# Patient Record
Sex: MALE | Race: OTHER | NOT HISPANIC OR LATINO | URBAN - METROPOLITAN AREA
[De-identification: names, ages, dates, MRNs, and addresses within clinical notes are randomized per-mention and may not be internally consistent; named-entity substitution may affect disease eponyms.]

---

## 2021-02-22 ENCOUNTER — EMERGENCY (EMERGENCY)
Facility: HOSPITAL | Age: 26
LOS: 1 days | Discharge: HOME | End: 2021-02-22
Admitting: EMERGENCY MEDICINE
Payer: COMMERCIAL

## 2021-02-22 VITALS
OXYGEN SATURATION: 97 % | RESPIRATION RATE: 18 BRPM | SYSTOLIC BLOOD PRESSURE: 132 MMHG | TEMPERATURE: 98 F | HEART RATE: 88 BPM | DIASTOLIC BLOOD PRESSURE: 78 MMHG

## 2021-02-22 VITALS
TEMPERATURE: 98 F | SYSTOLIC BLOOD PRESSURE: 117 MMHG | DIASTOLIC BLOOD PRESSURE: 67 MMHG | HEART RATE: 90 BPM | WEIGHT: 166.89 LBS | HEIGHT: 65 IN | RESPIRATION RATE: 18 BRPM | OXYGEN SATURATION: 98 %

## 2021-02-22 DIAGNOSIS — R53.81 OTHER MALAISE: ICD-10-CM

## 2021-02-22 DIAGNOSIS — R19.7 DIARRHEA, UNSPECIFIED: ICD-10-CM

## 2021-02-22 DIAGNOSIS — M79.10 MYALGIA, UNSPECIFIED SITE: ICD-10-CM

## 2021-02-22 LAB
ALBUMIN SERPL ELPH-MCNC: 4.8 G/DL — SIGNIFICANT CHANGE UP (ref 3.4–5)
ALP SERPL-CCNC: 78 U/L — SIGNIFICANT CHANGE UP (ref 40–120)
ALT FLD-CCNC: 77 U/L — HIGH (ref 12–42)
ANION GAP SERPL CALC-SCNC: 14 MMOL/L — SIGNIFICANT CHANGE UP (ref 9–16)
AST SERPL-CCNC: 40 U/L — HIGH (ref 15–37)
BASOPHILS # BLD AUTO: 0.03 K/UL — SIGNIFICANT CHANGE UP (ref 0–0.2)
BASOPHILS NFR BLD AUTO: 0.5 % — SIGNIFICANT CHANGE UP (ref 0–2)
BILIRUB SERPL-MCNC: 1.7 MG/DL — HIGH (ref 0.2–1.2)
BUN SERPL-MCNC: 11 MG/DL — SIGNIFICANT CHANGE UP (ref 7–23)
CALCIUM SERPL-MCNC: 10.1 MG/DL — SIGNIFICANT CHANGE UP (ref 8.5–10.5)
CHLORIDE SERPL-SCNC: 102 MMOL/L — SIGNIFICANT CHANGE UP (ref 96–108)
CO2 SERPL-SCNC: 22 MMOL/L — SIGNIFICANT CHANGE UP (ref 22–31)
CREAT SERPL-MCNC: 1.23 MG/DL — SIGNIFICANT CHANGE UP (ref 0.5–1.3)
EOSINOPHIL # BLD AUTO: 0.14 K/UL — SIGNIFICANT CHANGE UP (ref 0–0.5)
EOSINOPHIL NFR BLD AUTO: 2.4 % — SIGNIFICANT CHANGE UP (ref 0–6)
GLUCOSE SERPL-MCNC: 99 MG/DL — SIGNIFICANT CHANGE UP (ref 70–99)
HCT VFR BLD CALC: 45 % — SIGNIFICANT CHANGE UP (ref 39–50)
HGB BLD-MCNC: 15.4 G/DL — SIGNIFICANT CHANGE UP (ref 13–17)
IMM GRANULOCYTES NFR BLD AUTO: 0.3 % — SIGNIFICANT CHANGE UP (ref 0–1.5)
LYMPHOCYTES # BLD AUTO: 1 K/UL — SIGNIFICANT CHANGE UP (ref 1–3.3)
LYMPHOCYTES # BLD AUTO: 17.2 % — SIGNIFICANT CHANGE UP (ref 13–44)
MCHC RBC-ENTMCNC: 32.4 PG — SIGNIFICANT CHANGE UP (ref 27–34)
MCHC RBC-ENTMCNC: 34.2 GM/DL — SIGNIFICANT CHANGE UP (ref 32–36)
MCV RBC AUTO: 94.5 FL — SIGNIFICANT CHANGE UP (ref 80–100)
MONOCYTES # BLD AUTO: 0.53 K/UL — SIGNIFICANT CHANGE UP (ref 0–0.9)
MONOCYTES NFR BLD AUTO: 9.1 % — SIGNIFICANT CHANGE UP (ref 2–14)
NEUTROPHILS # BLD AUTO: 4.08 K/UL — SIGNIFICANT CHANGE UP (ref 1.8–7.4)
NEUTROPHILS NFR BLD AUTO: 70.5 % — SIGNIFICANT CHANGE UP (ref 43–77)
NRBC # BLD: 0 /100 WBCS — SIGNIFICANT CHANGE UP (ref 0–0)
PLATELET # BLD AUTO: 327 K/UL — SIGNIFICANT CHANGE UP (ref 150–400)
POTASSIUM SERPL-MCNC: 4 MMOL/L — SIGNIFICANT CHANGE UP (ref 3.5–5.3)
POTASSIUM SERPL-SCNC: 4 MMOL/L — SIGNIFICANT CHANGE UP (ref 3.5–5.3)
PROT SERPL-MCNC: 8.3 G/DL — HIGH (ref 6.4–8.2)
RBC # BLD: 4.76 M/UL — SIGNIFICANT CHANGE UP (ref 4.2–5.8)
RBC # FLD: 12.3 % — SIGNIFICANT CHANGE UP (ref 10.3–14.5)
SODIUM SERPL-SCNC: 138 MMOL/L — SIGNIFICANT CHANGE UP (ref 132–145)
WBC # BLD: 5.8 K/UL — SIGNIFICANT CHANGE UP (ref 3.8–10.5)
WBC # FLD AUTO: 5.8 K/UL — SIGNIFICANT CHANGE UP (ref 3.8–10.5)

## 2021-02-22 PROCEDURE — 99284 EMERGENCY DEPT VISIT MOD MDM: CPT

## 2021-02-22 NOTE — SBIRT NOTE ADULT - NSSBIRTDRGBRIEFINTDET_GEN_A_CORE
Provided SBIRT services:  Full Screen Positive. Brief Intervention Performed.    Screening results were reviewed with the patient and patient was provided information about healthy guidelines and potential negative consequences associated with level of risk. Motivation and readiness to reduce or stop use was discussed and goals and activities to make changes were suggested/offered.      AUDIT Score: 6  DAST-10 Score: 2  Duration = 15 Minutes

## 2021-02-22 NOTE — ED ADULT NURSE NOTE - OBJECTIVE STATEMENT
Pt came in c/o of diarrhea since last wednesday. Pt states "I was in the hospital two weeks ago for an UTI. They prescribed me with Cipro. I finished that. I still wasn't feeling better so I had STI medications and was prescribed doxycycline to take at home last wednesday. That day I also drank and took some mushrooms. Ever since then I had diarrhea. Im not sure if the diarrhea us coming from the medication or the mushrooms." Pt reports chills. Denies fever, sob, n/v, headache/dizziness, numbness/tingling. A&Ox3 speaking in full sentences at this time.

## 2021-02-22 NOTE — ED PROVIDER NOTE - OBJECTIVE STATEMENT
26 y/o male presents to the ED with complaints of diarrhea x 3 days. Patient recently had a UTI with outpatient abx treatment. Patient completed outpatient abx, complicated by concern for STI treated w/ IM ceftriaxone.  Pt is on day 3 of PO doxicycline and presents w/ diarrhea, fatigue and chills.  Pt denies dysuria, discharge or vomiting. 24 y/o male presents to the ED with complaints of diarrhea x 3 days. Patient recently had a UTI with outpatient abx treatment. Patient completed outpatient abx, but also notes he was recently treated with IM ceftriaxone for an STI. Patient is currently on day 3 of PO Doxycycline and presents with 1-2 episodes of brown/watery diarrhea, generalized body aches, and malaise. Patient has not had diarrhea since this morning. Denies fever, chills, chest pain, SOB, vomiting, dysuria.

## 2021-02-22 NOTE — ED PROVIDER NOTE - CLINICAL SUMMARY MEDICAL DECISION MAKING FREE TEXT BOX
Patient presenting with diarrhea, generalized body aches, and malaise s/p antibiotic use. Will obtain labs and reassess. Patient presenting with diarrhea, generalized body aches, and malaise s/p antibiotic use. Patient is very well-appearing and in NAD. Very low suspicious for any type of C. diff given recent abx use. Will obtain labs and reassess.

## 2021-02-22 NOTE — ED ADULT NURSE NOTE - CHIEF COMPLAINT QUOTE
Pt presents c/o diarrhea since Friday of this week.  Pt recently had a UTI w/ outpatient abx treatment.  Pt completed ourpatient abx, complicated by concern for STI treated w/ IM ceftriaxone.  Pt is on day 3 of PO doxicycline and presents w/ diarrhea, fatigue and chills.  Pt denies dysuria, discharge or vomiting.

## 2021-02-22 NOTE — ED PROVIDER NOTE - PATIENT PORTAL LINK FT
You can access the FollowMyHealth Patient Portal offered by [facility] by registering at the following website: [email or web address]. By joining [employer]s FollowMyHealth portal, you will also be able to view your health information using other applications (apps) compatible with our system.

## 2021-02-22 NOTE — SBIRT NOTE ADULT - NSSBIRTFEELGUILT_GEN_A_CORE
Father  Still living? Yes, Estimated age: 51-60  Family history of myocardial infarction, Age at diagnosis: Age Unknown  Family history of cerebrovascular accident (CVA), Age at diagnosis: Age Unknown     Mother  Still living? Yes, Estimated age: 51-60  Family history of myocardial infarction, Age at diagnosis: Age Unknown
Never

## 2022-07-15 ENCOUNTER — EMERGENCY (EMERGENCY)
Facility: HOSPITAL | Age: 27
LOS: 1 days | Discharge: HOME | End: 2022-07-15
Admitting: EMERGENCY MEDICINE

## 2022-07-15 VITALS
OXYGEN SATURATION: 100 % | RESPIRATION RATE: 16 BRPM | SYSTOLIC BLOOD PRESSURE: 123 MMHG | HEIGHT: 65 IN | WEIGHT: 166.89 LBS | HEART RATE: 76 BPM | TEMPERATURE: 99 F | DIASTOLIC BLOOD PRESSURE: 91 MMHG

## 2022-07-15 PROCEDURE — 99283 EMERGENCY DEPT VISIT LOW MDM: CPT

## 2022-07-15 NOTE — ED PROVIDER NOTE - CLINICAL SUMMARY MEDICAL DECISION MAKING FREE TEXT BOX
pt presents with c/o rash to buttocks. no scrotal involvement. improved with antifungal. does not appear fungal at this time but advised continued use of spray for symptomatic relief. also recommended regular washing, using baby powder to reduce moisture and friction for possible heat rash.

## 2022-07-15 NOTE — ED ADULT TRIAGE NOTE - CHIEF COMPLAINT QUOTE
Pt states groin rash since last wednesday, Pt states spreading from buttock area to testicles. Pt states it was itchy but now feels like a "sunburn". Denies fevers or drainage. Some relief with antifungal cream

## 2022-07-15 NOTE — ED ADULT NURSE NOTE - OBJECTIVE STATEMENT
Pt came in c/o of rash to b/l buttocks. Pt reports using antifungal with relief. Denies fever, chills, sob, cp, n/v/d. A&Ox3 speaking in full sentences

## 2022-07-15 NOTE — ED PROVIDER NOTE - PATIENT PORTAL LINK FT
You can access the FollowMyHealth Patient Portal offered by [facility] by registering at the following website: [email or web address]. By joining [employer]’s FollowMyHealth portal, you will also be able to view your health information using other applications (apps) compatible with our system.

## 2022-07-15 NOTE — ED PROVIDER NOTE - NSFOLLOWUPINSTRUCTIONS_ED_ALL_ED_FT
CONTINUE SPRAY AS INSTRUCTED ON BOTTLE IF IT'S HELPING.     USE BABY POWDER TO REDUCE FRICTION AND MOISTURE WHICH CAN CAUSE SYMPTOMS.     Acute Rash    WHAT YOU NEED TO KNOW:    A rash is irritation, redness, or itchiness in the skin or mucus membranes. Mucus membranes are areas such as the lining of your nose or throat. Acute means the rash starts suddenly, worsens quickly, and lasts a short time. An acute rash may be caused by a disease, such as hepatitis or vasculitis. The rash may be a reaction to something you are allergic to, such as certain foods, or latex. Certain medicines, including antibiotics, NSAIDs, prescription pain medicine, and aspirin can also cause a rash.     DISCHARGE INSTRUCTIONS:    Return to the emergency department if:     You have sudden trouble breathing or chest pain.      You are vomiting, have a headache or muscle aches, and your throat hurts.    Contact your healthcare provider if:     You have a fever.      You get open wounds from scratching your skin, or you have a wound that is red, swollen, or painful.       Your rash lasts longer than 3 months.      You have swelling or pain in your joints.       You have questions or concerns about your condition or care.    Medicines: If your rash does not go away on its own, you may need the following medicines:     Antihistamines may be given to help decrease itching.       Steroids may be given to decrease inflammation.      Antibiotics help fight or prevent a bacterial infection.       Take your medicine as directed. Contact your healthcare provider if you think your medicine is not helping or if you have side effects. Tell him of her if you are allergic to any medicine. Keep a list of the medicines, vitamins, and herbs you take. Include the amounts, and when and why you take them. Bring the list or the pill bottles to follow-up visits. Carry your medicine list with you in case of an emergency.    Prevent a rash or care for your skin when you have a rash: Dry skin can lead to more problems. Do not scratch your skin if it itches. You may cause a skin infection by scratching. The following may prevent dry skin, and help your skin look better:     Use thick cream lotions or petroleum jelly to help soothe your rash. These products work well on areas with thick skin, such as your feet. Cool compresses may also be used to soothe your skin. Apply a cool compress or a cool, wet towel, and then cover it with a dry towel.       Use lukewarm water when you bathe. Hot water may damage your skin more. Pat your skin dry. Do not rub your skin with a towel.       Use detergents, soaps, shampoos, and bubble baths made for sensitive skin. Wear clothes that are made of cotton instead of nylon or wool. Cotton is softer, so it will not hurt your skin as much.    Follow up with your healthcare provider as directed: You may need to see a dermatologist if healthcare providers do not know what is causing your rash. You may also need to see a dermatologist if your rash does not get better even with treatment. You may need to see a dietitian if you have allergies to foods. Write down your questions so you remember to ask them during your visits.

## 2022-07-15 NOTE — ED PROVIDER NOTE - PHYSICAL EXAMINATION
Constitutional: Well appearing, awake, alert, oriented to person, place, time/situation and in no apparent distress.  HEENT: Airway patent, NCAT    Resp: no conversational dyspnea, tachypnea, or signs of respiratory distress  Msk: ambulating with normal steady gait  Neuro: A&Ox3 Constitutional: Well appearing, awake, alert, oriented to person, place, time/situation and in no apparent distress.  HEENT: Airway patent, NCAT  : chaperoned by RN Mikael Cary: small areas of erythematous pinpoint bumps to buttocks without consistent erythema, induration, drainage, tenderness, vesicles, no scrotal involvement.   Resp: no conversational dyspnea, tachypnea, or signs of respiratory distress  Msk: ambulating with normal steady gait  Neuro: A&Ox3

## 2022-07-15 NOTE — ED PROVIDER NOTE - OBJECTIVE STATEMENT
25yo M presents with c/o rash to buttock x 1 week. states it initially started near his anus and has spread to his butt cheeks and folds along the under side of his buttocks. admits he was working a lot and did not have the best hygiene last week and was having considerable sweating. he started treated with an antifungal spray for "jock itch" which has  improved his symptoms. he describes "hot" feeling but denies any lesions, fever, chills, diarrhea, bloody stool, sore throat, anal sexual contact, scrotal redness or pain, any other concerns.

## 2022-07-17 DIAGNOSIS — R21 RASH AND OTHER NONSPECIFIC SKIN ERUPTION: ICD-10-CM

## 2022-07-17 DIAGNOSIS — L29.9 PRURITUS, UNSPECIFIED: ICD-10-CM

## 2025-04-17 NOTE — ED ADULT TRIAGE NOTE - TEMPERATURE IN CELSIUS (DEGREES C)
FORM:    Physician Order form received via fax 04/17/25 from [facility].    Instructions: Comments: Please complete and sign this Physician Orer Form with Medication and Diagnosis Lists, printed Physician Name and NPI number.    It is MANDATORY TO COMPLETE ALL NEEDED INFORMATION ON THIS FORM.    THE STATE WILL ONLY ACCEPT THE SIGNATURE OF MD.    PLEASE DO NOT HAVE NURSE PRACTITIONER SIGN  UNLESS TO CO-SIGN    Thank You,  Sherrie Guidry-Prior Authorization Specialist    Placed in PCP mailbox     For Further Information, You can call [phone]  Fax Number [phone]   [phone]